# Patient Record
Sex: MALE | Race: WHITE
[De-identification: names, ages, dates, MRNs, and addresses within clinical notes are randomized per-mention and may not be internally consistent; named-entity substitution may affect disease eponyms.]

---

## 2017-05-10 ENCOUNTER — HOSPITAL ENCOUNTER (EMERGENCY)
Dept: HOSPITAL 83 - ED | Age: 22
Discharge: HOME | End: 2017-05-10
Payer: COMMERCIAL

## 2017-05-10 VITALS — WEIGHT: 211 LBS | HEIGHT: 60 IN

## 2017-05-10 VITALS — DIASTOLIC BLOOD PRESSURE: 64 MMHG | SYSTOLIC BLOOD PRESSURE: 127 MMHG

## 2017-05-10 DIAGNOSIS — Z79.899: ICD-10-CM

## 2017-05-10 DIAGNOSIS — R11.0: ICD-10-CM

## 2017-05-10 DIAGNOSIS — M79.1: Primary | ICD-10-CM

## 2017-05-10 LAB
ALBUMIN SERPL-MCNC: 3.9 GM/DL (ref 3.1–4.5)
ALBUMIN SERPL-MCNC: NEGATIVE G/DL
ALP SERPL-CCNC: 104 U/L (ref 45–117)
ALT SERPL W P-5'-P-CCNC: 46 U/L (ref 12–78)
AMPHETAMINES UR QL SCN: < 1000
APPEARANCE UR: (no result)
AST SERPL-CCNC: 20 IU/L (ref 3–35)
BACTERIA #/AREA URNS HPF: (no result) /[HPF]
BARBITURATES UR QL SCN: < 200
BASOPHILS # BLD AUTO: 0 10*3/UL (ref 0–0.1)
BASOPHILS NFR BLD AUTO: 0.3 % (ref 0–1)
BILIRUB UR QL STRIP: (no result)
BUN SERPL-MCNC: 11 MG/DL (ref 7–24)
BZE UR QL SCN: < 300
CHLORIDE SERPL-SCNC: 107 MMOL/L (ref 98–107)
CO2 SERPL-SCNC: 25 MMOL/L (ref 21–32)
COLOR UR: YELLOW
EOSINOPHIL # BLD AUTO: 0.1 10*3/UL (ref 0–0.4)
EOSINOPHIL # BLD AUTO: 1.3 % (ref 1–4)
EPI CELLS #/AREA URNS HPF: (no result) /[HPF]
ERYTHROCYTE [DISTWIDTH] IN BLOOD BY AUTOMATED COUNT: 13.2 % (ref 0–14.5)
GLUCOSE SERPL-MCNC: 103 MG/DL (ref 65–99)
GLUCOSE UR QL: NEGATIVE
HCT VFR BLD AUTO: 42.6 % (ref 42–52)
HGB BLD-MCNC: 14.4 G/DL (ref 14–18)
HGB UR QL STRIP: NEGATIVE
IG #: 0 10*3/UL (ref 0–0.1)
KETONES UR QL STRIP: (no result)
LEUKOCYTE ESTERASE UR QL STRIP: NEGATIVE
LYMPHOCYTES # BLD AUTO: 2.4 10*3/UL (ref 1.3–4.4)
LYMPHOCYTES NFR BLD AUTO: 28.3 % (ref 27–41)
MCH RBC QN AUTO: 28.4 PG (ref 27–31)
MCHC RBC AUTO-ENTMCNC: 33.8 G/DL (ref 33–37)
MCV RBC AUTO: 84 FL (ref 80–94)
MONOCYTES # BLD AUTO: 0.7 10*3/UL (ref 0.1–1)
MONOCYTES NFR BLD MANUAL: 8 % (ref 3–9)
MUCOUS THREADS URNS QL MICRO: (no result)
NEUT #: 5.3 10*3/UL (ref 2.3–7.9)
NEUT %: 61.8 % (ref 47–73)
NITRITE UR QL STRIP: NEGATIVE
NRBC BLD QL AUTO: 0 10*3/UL (ref 0–0)
PH UR STRIP: 5.5 [PH] (ref 5–9)
PLATELET # BLD AUTO: 232 10*3/UL (ref 130–400)
PMV BLD AUTO: 10.8 FL (ref 9.6–12.3)
POTASSIUM SERPL-SCNC: 3.7 MMOL/L (ref 3.5–5.1)
PROT SERPL-MCNC: 7.4 GM/DL (ref 6.4–8.2)
RBC # BLD AUTO: 5.07 10*6/UL (ref 4.5–5.9)
RBC #/AREA URNS HPF: (no result) RBC/HPF (ref 0–2)
SODIUM SERPL-SCNC: 140 MMOL/L (ref 136–145)
SP GR UR: 1.02 (ref 1–1.03)
URINE REFLEX COMMENT: YES
UROBILINOGEN UR STRIP-MCNC: 0.2 E.U./DL (ref 0.2–1)
WBC #/AREA URNS HPF: (no result) WBC/HPF (ref 0–5)
WBC NRBC COR # BLD AUTO: 8.6 10*3/UL (ref 4.8–10.8)

## 2019-11-14 ENCOUNTER — HOSPITAL ENCOUNTER (INPATIENT)
Dept: HOSPITAL 83 - ED | Age: 24
LOS: 8 days | Discharge: HOME | DRG: 166 | End: 2019-11-22
Attending: INTERNAL MEDICINE | Admitting: INTERNAL MEDICINE
Payer: COMMERCIAL

## 2019-11-14 VITALS — DIASTOLIC BLOOD PRESSURE: 65 MMHG | SYSTOLIC BLOOD PRESSURE: 113 MMHG

## 2019-11-14 VITALS — WEIGHT: 210.19 LBS | BODY MASS INDEX: 28.47 KG/M2 | HEIGHT: 71.97 IN

## 2019-11-14 VITALS — DIASTOLIC BLOOD PRESSURE: 66 MMHG

## 2019-11-14 VITALS — DIASTOLIC BLOOD PRESSURE: 70 MMHG | SYSTOLIC BLOOD PRESSURE: 124 MMHG

## 2019-11-14 DIAGNOSIS — D72.829: ICD-10-CM

## 2019-11-14 DIAGNOSIS — E83.41: ICD-10-CM

## 2019-11-14 DIAGNOSIS — Z79.899: ICD-10-CM

## 2019-11-14 DIAGNOSIS — E66.3: ICD-10-CM

## 2019-11-14 DIAGNOSIS — Z79.82: ICD-10-CM

## 2019-11-14 DIAGNOSIS — D64.9: ICD-10-CM

## 2019-11-14 DIAGNOSIS — A15.0: ICD-10-CM

## 2019-11-14 DIAGNOSIS — R73.9: ICD-10-CM

## 2019-11-14 DIAGNOSIS — G80.9: ICD-10-CM

## 2019-11-14 DIAGNOSIS — F12.90: ICD-10-CM

## 2019-11-14 DIAGNOSIS — I95.9: ICD-10-CM

## 2019-11-14 DIAGNOSIS — G62.9: ICD-10-CM

## 2019-11-14 DIAGNOSIS — M51.36: ICD-10-CM

## 2019-11-14 DIAGNOSIS — F41.9: ICD-10-CM

## 2019-11-14 DIAGNOSIS — J85.1: ICD-10-CM

## 2019-11-14 DIAGNOSIS — E66.9: ICD-10-CM

## 2019-11-14 DIAGNOSIS — J95.811: ICD-10-CM

## 2019-11-14 DIAGNOSIS — R91.8: ICD-10-CM

## 2019-11-14 DIAGNOSIS — R74.8: ICD-10-CM

## 2019-11-14 DIAGNOSIS — F32.9: ICD-10-CM

## 2019-11-14 DIAGNOSIS — G89.29: ICD-10-CM

## 2019-11-14 DIAGNOSIS — R07.1: ICD-10-CM

## 2019-11-14 DIAGNOSIS — E44.1: ICD-10-CM

## 2019-11-14 DIAGNOSIS — J98.4: Primary | ICD-10-CM

## 2019-11-14 DIAGNOSIS — E87.6: ICD-10-CM

## 2019-11-14 LAB
ALBUMIN SERPL-MCNC: 3.6 GM/DL (ref 3.1–4.5)
ALP SERPL-CCNC: 136 U/L (ref 45–117)
ALT SERPL W P-5'-P-CCNC: 46 U/L (ref 12–78)
APPEARANCE UR: (no result)
APTT PPP: 30.9 SECONDS (ref 20–32.1)
AST SERPL-CCNC: 16 IU/L (ref 3–35)
BASOPHILS # BLD AUTO: 0.1 10*3/UL (ref 0–0.1)
BASOPHILS NFR BLD AUTO: 0.3 % (ref 0–1)
BILIRUB UR QL STRIP: NEGATIVE
BUN SERPL-MCNC: 12 MG/DL (ref 7–24)
CHLORIDE SERPL-SCNC: 103 MMOL/L (ref 98–107)
COLOR UR: YELLOW
CREAT SERPL-MCNC: 0.95 MG/DL (ref 0.7–1.3)
EOSINOPHIL # BLD AUTO: 0.1 10*3/UL (ref 0–0.4)
EOSINOPHIL # BLD AUTO: 0.8 % (ref 1–4)
ERYTHROCYTE [DISTWIDTH] IN BLOOD BY AUTOMATED COUNT: 12.9 % (ref 0–14.5)
GLUCOSE UR QL: NEGATIVE
HCT VFR BLD AUTO: 41.4 % (ref 42–52)
HGB BLD-MCNC: 13.6 G/DL (ref 14–18)
HGB UR QL STRIP: NEGATIVE
INR BLD: 1 (ref 2–3.5)
KETONES UR QL STRIP: NEGATIVE
LEUKOCYTE ESTERASE UR QL STRIP: NEGATIVE
LIPASE SERPL-CCNC: 57 U/L (ref 73–393)
LYMPHOCYTES # BLD AUTO: 2.6 10*3/UL (ref 1.3–4.4)
LYMPHOCYTES NFR BLD AUTO: 15.2 % (ref 27–41)
MCH RBC QN AUTO: 28.4 PG (ref 27–31)
MCHC RBC AUTO-ENTMCNC: 32.9 G/DL (ref 33–37)
MCV RBC AUTO: 86.4 FL (ref 80–94)
MONOCYTES # BLD AUTO: 1.2 10*3/UL (ref 0.1–1)
MONOCYTES NFR BLD MANUAL: 6.9 % (ref 3–9)
NEUT #: 12.8 10*3/UL (ref 2.3–7.9)
NEUT %: 75.9 % (ref 47–73)
NITRITE UR QL STRIP: NEGATIVE
NRBC BLD QL AUTO: 0 10*3/UL (ref 0–0)
PH UR STRIP: 8.5 [PH] (ref 5–9)
PLATELET # BLD AUTO: 355 10*3/UL (ref 130–400)
PMV BLD AUTO: 10.6 FL (ref 9.6–12.3)
POTASSIUM SERPL-SCNC: 3.7 MMOL/L (ref 3.5–5.1)
PROT SERPL-MCNC: 8.2 GM/DL (ref 6.4–8.2)
RBC # BLD AUTO: 4.79 10*6/UL (ref 4.5–5.9)
SODIUM SERPL-SCNC: 137 MMOL/L (ref 136–145)
SP GR UR: 1.01 (ref 1–1.03)
TROPONIN I SERPL-MCNC: < 0.015 NG/ML (ref ?–0.04)
UROBILINOGEN UR STRIP-MCNC: 0.2 E.U./DL (ref 0.2–1)
WBC NRBC COR # BLD AUTO: 16.9 10*3/UL (ref 4.8–10.8)

## 2019-11-14 NOTE — NUR
THIS RN ADVISED BY MIKE HE, DUE TO MASS FOUND DURING CT TESTING, PT MAY
BE OF CONCERN FOR POSSIBLE TB.MIKE LEMOS HAS HAD PT WEARING MASK AS PRECAUTION.

## 2019-11-14 NOTE — NUR
A 24, admitted to  in negative air flow precautions, under the
services of PATO Sevilla DO with a diagnosis of lung mass.
Chief complaint is lower back pain along with shortness of breath.
Patient arrived via stretcher from ER.
Monitor applied. Initial assessment completed.
Vital signs taken and recorded.
PATO SEVILLA DO notified of admission to the unit.
Orders received.
See assessment for past medical history, medications
and allergies.
Patient and/or family oriented to unit. [facility].
Clothing/patient valuable form completed.
 
FABIENNE FELDER

## 2019-11-14 NOTE — NUR
DR NIEVES AT BEDSIDE TO SPEAK WITH PT, ADVISED BY DOCTOR PT SHOULD BE PLACED IN
NEGATIVE PRESSURE ROOM FOR ADMISSION UNTIL PT SEEN BY PULMONOLOGIST.SIGN PLACED
ON PT DOOR.PT AWAITING ADMISSION.

## 2019-11-14 NOTE — NUR
PT REPORTS TINGLING IN "MY WHOLE RIGHT ARM" AFTER DELTOID INJECTION OF
TORADOL.  INJECTION WAS PROVIDED IN CORRECT ANATOMICAL LOCATION FAR FROM ANY
NERVES.  WILL MONITOR THIS COMPLAINT.

## 2019-11-15 VITALS — DIASTOLIC BLOOD PRESSURE: 68 MMHG | SYSTOLIC BLOOD PRESSURE: 126 MMHG

## 2019-11-15 VITALS — DIASTOLIC BLOOD PRESSURE: 78 MMHG

## 2019-11-15 VITALS — DIASTOLIC BLOOD PRESSURE: 74 MMHG

## 2019-11-15 VITALS — DIASTOLIC BLOOD PRESSURE: 70 MMHG

## 2019-11-15 LAB
BASOPHILS # BLD AUTO: 0.1 10*3/UL (ref 0–0.1)
BASOPHILS # BLD AUTO: 1 % (ref 0–1)
BASOPHILS NFR BLD AUTO: 0.4 % (ref 0–1)
BUN SERPL-MCNC: 13 MG/DL (ref 7–24)
CHLORIDE SERPL-SCNC: 107 MMOL/L (ref 98–107)
CREAT SERPL-MCNC: 1.08 MG/DL (ref 0.7–1.3)
EOSINOPHIL # BLD AUTO: 0.2 10*3/UL (ref 0–0.4)
EOSINOPHIL # BLD AUTO: 1.4 % (ref 1–4)
ERYTHROCYTE [DISTWIDTH] IN BLOOD BY AUTOMATED COUNT: 12.8 % (ref 0–14.5)
ERYTHROCYTE [DISTWIDTH] IN BLOOD BY AUTOMATED COUNT: 12.9 % (ref 0–14.5)
HCT VFR BLD AUTO: 38.6 % (ref 42–52)
HCT VFR BLD AUTO: 38.7 % (ref 42–52)
HGB BLD-MCNC: 12.5 G/DL (ref 14–18)
HGB BLD-MCNC: 12.7 G/DL (ref 14–18)
LYMPHOCYTES # BLD AUTO: 2.5 10*3/UL (ref 1.3–4.4)
LYMPHOCYTES NFR BLD AUTO: 18.4 % (ref 27–41)
MCH RBC QN AUTO: 27.6 PG (ref 27–31)
MCH RBC QN AUTO: 28.2 PG (ref 27–31)
MCHC RBC AUTO-ENTMCNC: 32.3 G/DL (ref 33–37)
MCHC RBC AUTO-ENTMCNC: 32.9 G/DL (ref 33–37)
MCV RBC AUTO: 85.4 FL (ref 80–94)
MCV RBC AUTO: 85.8 FL (ref 80–94)
MONOCYTES # BLD AUTO: 0.9 10*3/UL (ref 0.1–1)
MONOCYTES NFR BLD MANUAL: 6.5 % (ref 3–9)
NEUT #: 9.7 10*3/UL (ref 2.3–7.9)
NEUT %: 72.5 % (ref 47–73)
NRBC BLD QL AUTO: 0 % (ref 0–0)
NRBC BLD QL AUTO: 0 10*3/UL (ref 0–0)
PHOSPHATE SERPL-MCNC: 4 MG/DL (ref 2.5–4.9)
PLATELET # BLD AUTO: 311 10*3/UL (ref 130–400)
PLATELET # BLD AUTO: 325 10*3/UL (ref 130–400)
PLATELET SUFFICIENCY: NORMAL
PMV BLD AUTO: 10.5 FL (ref 9.6–12.3)
PMV BLD AUTO: 10.5 FL (ref 9.6–12.3)
POTASSIUM SERPL-SCNC: 3.4 MMOL/L (ref 3.5–5.1)
RBC # BLD AUTO: 4.5 10*6/UL (ref 4.5–5.9)
RBC # BLD AUTO: 4.53 10*6/UL (ref 4.5–5.9)
RBC MORPH BLD: NORMAL
SODIUM SERPL-SCNC: 140 MMOL/L (ref 136–145)
TOTAL CELLS COUNTED: 100 #CELLS
WBC NRBC COR # BLD AUTO: 13.3 10*3/UL (ref 4.8–10.8)
WBC NRBC COR # BLD AUTO: 14.3 10*3/UL (ref 4.8–10.8)

## 2019-11-16 VITALS — DIASTOLIC BLOOD PRESSURE: 77 MMHG

## 2019-11-16 VITALS — SYSTOLIC BLOOD PRESSURE: 129 MMHG | DIASTOLIC BLOOD PRESSURE: 78 MMHG

## 2019-11-16 VITALS — DIASTOLIC BLOOD PRESSURE: 72 MMHG | SYSTOLIC BLOOD PRESSURE: 125 MMHG

## 2019-11-16 VITALS — DIASTOLIC BLOOD PRESSURE: 64 MMHG | SYSTOLIC BLOOD PRESSURE: 102 MMHG

## 2019-11-16 VITALS — DIASTOLIC BLOOD PRESSURE: 73 MMHG

## 2019-11-16 LAB
BASOPHILS # BLD AUTO: 0.1 10*3/UL (ref 0–0.1)
BASOPHILS NFR BLD AUTO: 0.5 % (ref 0–1)
BUN SERPL-MCNC: 8 MG/DL (ref 7–24)
CHLORIDE SERPL-SCNC: 109 MMOL/L (ref 98–107)
CREAT SERPL-MCNC: 1.15 MG/DL (ref 0.7–1.3)
EOSINOPHIL # BLD AUTO: 0.2 10*3/UL (ref 0–0.4)
EOSINOPHIL # BLD AUTO: 1.5 % (ref 1–4)
ERYTHROCYTE [DISTWIDTH] IN BLOOD BY AUTOMATED COUNT: 13 % (ref 0–14.5)
HCT VFR BLD AUTO: 40.6 % (ref 42–52)
HGB BLD-MCNC: 12.9 G/DL (ref 14–18)
LYMPHOCYTES # BLD AUTO: 2.6 10*3/UL (ref 1.3–4.4)
LYMPHOCYTES NFR BLD AUTO: 20.7 % (ref 27–41)
MCH RBC QN AUTO: 27.7 PG (ref 27–31)
MCHC RBC AUTO-ENTMCNC: 31.8 G/DL (ref 33–37)
MCV RBC AUTO: 87.3 FL (ref 80–94)
MONOCYTES # BLD AUTO: 1 10*3/UL (ref 0.1–1)
MONOCYTES NFR BLD MANUAL: 8.2 % (ref 3–9)
NEUT #: 8.5 10*3/UL (ref 2.3–7.9)
NEUT %: 68.2 % (ref 47–73)
NRBC BLD QL AUTO: 0 % (ref 0–0)
PLATELET # BLD AUTO: 336 10*3/UL (ref 130–400)
PMV BLD AUTO: 10.6 FL (ref 9.6–12.3)
POTASSIUM SERPL-SCNC: 4.1 MMOL/L (ref 3.5–5.1)
RBC # BLD AUTO: 4.65 10*6/UL (ref 4.5–5.9)
SODIUM SERPL-SCNC: 141 MMOL/L (ref 136–145)
WBC NRBC COR # BLD AUTO: 12.4 10*3/UL (ref 4.8–10.8)

## 2019-11-17 VITALS — DIASTOLIC BLOOD PRESSURE: 70 MMHG | SYSTOLIC BLOOD PRESSURE: 118 MMHG

## 2019-11-17 VITALS — DIASTOLIC BLOOD PRESSURE: 65 MMHG

## 2019-11-17 VITALS — DIASTOLIC BLOOD PRESSURE: 55 MMHG

## 2019-11-17 VITALS — DIASTOLIC BLOOD PRESSURE: 67 MMHG

## 2019-11-17 LAB
BASOPHILS # BLD AUTO: 0.1 10*3/UL (ref 0–0.1)
BASOPHILS NFR BLD AUTO: 0.7 % (ref 0–1)
BUN SERPL-MCNC: 10 MG/DL (ref 7–24)
CHLORIDE SERPL-SCNC: 106 MMOL/L (ref 98–107)
CREAT SERPL-MCNC: 1.1 MG/DL (ref 0.7–1.3)
EOSINOPHIL # BLD AUTO: 0.2 10*3/UL (ref 0–0.4)
EOSINOPHIL # BLD AUTO: 2 % (ref 1–4)
ERYTHROCYTE [DISTWIDTH] IN BLOOD BY AUTOMATED COUNT: 12.9 % (ref 0–14.5)
HCT VFR BLD AUTO: 40 % (ref 42–52)
HGB BLD-MCNC: 12.9 G/DL (ref 14–18)
LYMPHOCYTES # BLD AUTO: 3.2 10*3/UL (ref 1.3–4.4)
LYMPHOCYTES NFR BLD AUTO: 27 % (ref 27–41)
MCH RBC QN AUTO: 27.6 PG (ref 27–31)
MCHC RBC AUTO-ENTMCNC: 32.3 G/DL (ref 33–37)
MCV RBC AUTO: 85.7 FL (ref 80–94)
MONOCYTES # BLD AUTO: 0.9 10*3/UL (ref 0.1–1)
MONOCYTES NFR BLD MANUAL: 7.6 % (ref 3–9)
NEUT #: 7.3 10*3/UL (ref 2.3–7.9)
NEUT %: 61.6 % (ref 47–73)
NRBC BLD QL AUTO: 0 10*3/UL (ref 0–0)
PLATELET # BLD AUTO: 358 10*3/UL (ref 130–400)
PMV BLD AUTO: 10.6 FL (ref 9.6–12.3)
POTASSIUM SERPL-SCNC: 3.9 MMOL/L (ref 3.5–5.1)
RBC # BLD AUTO: 4.67 10*6/UL (ref 4.5–5.9)
SODIUM SERPL-SCNC: 141 MMOL/L (ref 136–145)
WBC NRBC COR # BLD AUTO: 11.9 10*3/UL (ref 4.8–10.8)

## 2019-11-17 NOTE — NUR
SITTING IN CHAIR. STATES RELIEF FROM EARLIER MEDS. CALL LIGHT WITHIN REACH.
NO FURTHER VOICED COMPLAINTS

## 2019-11-17 NOTE — NUR
REQUESTED AND RECEIVED NORCO PER PRN ORDER FOR COMPLAINTS OF BACK PAIN RATING
A 6. CALL LIGHT WITHIN REACH. WILL MONITOR FOR EFFECTIVENESS

## 2019-11-17 NOTE — NUR
SLEEPING, AWAKENS EASILY. RESPIRATIONS EASY. LUNGS CLEAR. PULSE OX 98% RA.
NON-PROD COUGH - AWARE OF NEED FOR SPUTUM SPECIMEN AND CUP REMAINS AT
BEDSIDE. BRONCH 11/18 DISCUSSED. CALL LIGHT WITHIN REACH. NO VOICED COMPLAINTS

## 2019-11-18 VITALS — DIASTOLIC BLOOD PRESSURE: 64 MMHG

## 2019-11-18 VITALS — SYSTOLIC BLOOD PRESSURE: 140 MMHG | DIASTOLIC BLOOD PRESSURE: 81 MMHG

## 2019-11-18 VITALS — SYSTOLIC BLOOD PRESSURE: 130 MMHG | DIASTOLIC BLOOD PRESSURE: 70 MMHG

## 2019-11-18 VITALS — DIASTOLIC BLOOD PRESSURE: 71 MMHG

## 2019-11-18 VITALS — SYSTOLIC BLOOD PRESSURE: 101 MMHG | DIASTOLIC BLOOD PRESSURE: 62 MMHG

## 2019-11-18 VITALS — SYSTOLIC BLOOD PRESSURE: 115 MMHG | DIASTOLIC BLOOD PRESSURE: 66 MMHG

## 2019-11-18 VITALS — DIASTOLIC BLOOD PRESSURE: 70 MMHG | SYSTOLIC BLOOD PRESSURE: 114 MMHG

## 2019-11-18 LAB
BASOPHILS # BLD AUTO: 0.1 10*3/UL (ref 0–0.1)
BASOPHILS NFR BLD AUTO: 0.5 % (ref 0–1)
BUN SERPL-MCNC: 10 MG/DL (ref 7–24)
CHLORIDE SERPL-SCNC: 106 MMOL/L (ref 98–107)
CREAT SERPL-MCNC: 1.16 MG/DL (ref 0.7–1.3)
DEPRECATED POLYS/LEUK NFR FLD: 97 %
EOSINOPHIL # BLD AUTO: 0.3 10*3/UL (ref 0–0.4)
EOSINOPHIL # BLD AUTO: 2.4 % (ref 1–4)
ERYTHROCYTE [DISTWIDTH] IN BLOOD BY AUTOMATED COUNT: 13 % (ref 0–14.5)
HCT VFR BLD AUTO: 41.2 % (ref 42–52)
HGB BLD-MCNC: 13.2 G/DL (ref 14–18)
LYMPHOCYTES # BLD AUTO: 3.6 10*3/UL (ref 1.3–4.4)
LYMPHOCYTES NFR BLD AUTO: 31.6 % (ref 27–41)
MACROPHAGES NFR FLD MANUAL: 2 %
MCH RBC QN AUTO: 27.8 PG (ref 27–31)
MCHC RBC AUTO-ENTMCNC: 32 G/DL (ref 33–37)
MCV RBC AUTO: 86.7 FL (ref 80–94)
MONOCYTES # BLD AUTO: 0.8 10*3/UL (ref 0.1–1)
MONOCYTES NFR BLD MANUAL: 7.2 % (ref 3–9)
NEUT #: 6.5 10*3/UL (ref 2.3–7.9)
NEUT %: 57.2 % (ref 47–73)
NEUTROPHILS NFR FLD MANUAL: 1 %
NRBC BLD QL AUTO: 0 % (ref 0–0)
PLATELET # BLD AUTO: 355 10*3/UL (ref 130–400)
PMV BLD AUTO: 10.6 FL (ref 9.6–12.3)
POTASSIUM SERPL-SCNC: 4.1 MMOL/L (ref 3.5–5.1)
RBC # BLD AUTO: 4.75 10*6/UL (ref 4.5–5.9)
SODIUM SERPL-SCNC: 140 MMOL/L (ref 136–145)
WBC NRBC COR # BLD AUTO: 11.3 10*3/UL (ref 4.8–10.8)

## 2019-11-18 PROCEDURE — 0B9C8ZX DRAINAGE OF RIGHT UPPER LUNG LOBE, VIA NATURAL OR ARTIFICIAL OPENING ENDOSCOPIC, DIAGNOSTIC: ICD-10-PCS | Performed by: INTERNAL MEDICINE

## 2019-11-18 NOTE — NUR
Patient resting quietly with no c/o discomfort. Respirations easy and regular.
Vital signs stable. No overt distress.
MINI ACOSTA

## 2019-11-18 NOTE — NUR
in to talk to patient.
Patient states lives at home with girlfriend.
There are few steps in the home.
Physician: resident clinic
Pharmacy:
Home health services: none4
Patient's level of ADLs: MINIMAL ASSIST
Patient has working utilities: all working
DME: cane
Follow-up physician's appointment after d/c: will be made by hospitalist nurse
director upon discharge
Does patient want to access PORTAL?: no
Discharge plan discussed with patient, he states he lives at home with
girlfriend and her family, he states he uses a cane for ambulation. he states
he has been having some difficulty ambulating currently due to hip issues,
discussed with him a short term skilled nursing for rehab and he declined,
also discussed VNA with rehab and he also declined, stated his girlfriend  is a
certified nurses aid and cares for him at home, also educated him that he may
need physical therapy also and he declines any VNA, case management will follow.
 
TERRANCE OLMOS

## 2019-11-19 VITALS — DIASTOLIC BLOOD PRESSURE: 62 MMHG

## 2019-11-19 VITALS — DIASTOLIC BLOOD PRESSURE: 50 MMHG

## 2019-11-19 VITALS — SYSTOLIC BLOOD PRESSURE: 130 MMHG | DIASTOLIC BLOOD PRESSURE: 88 MMHG

## 2019-11-19 VITALS — DIASTOLIC BLOOD PRESSURE: 65 MMHG

## 2019-11-19 VITALS — DIASTOLIC BLOOD PRESSURE: 53 MMHG

## 2019-11-19 LAB
BASOPHILS # BLD AUTO: 0.1 10*3/UL (ref 0–0.1)
BASOPHILS NFR BLD AUTO: 0.5 % (ref 0–1)
BUN SERPL-MCNC: 10 MG/DL (ref 7–24)
CHLORIDE SERPL-SCNC: 108 MMOL/L (ref 98–107)
CREAT SERPL-MCNC: 1.08 MG/DL (ref 0.7–1.3)
EOSINOPHIL # BLD AUTO: 0.3 10*3/UL (ref 0–0.4)
EOSINOPHIL # BLD AUTO: 2.2 % (ref 1–4)
ERYTHROCYTE [DISTWIDTH] IN BLOOD BY AUTOMATED COUNT: 13.2 % (ref 0–14.5)
HCT VFR BLD AUTO: 40.4 % (ref 42–52)
HGB BLD-MCNC: 13 G/DL (ref 14–18)
LYMPHOCYTES # BLD AUTO: 3.2 10*3/UL (ref 1.3–4.4)
LYMPHOCYTES NFR BLD AUTO: 27.8 % (ref 27–41)
MCH RBC QN AUTO: 27.8 PG (ref 27–31)
MCHC RBC AUTO-ENTMCNC: 32.2 G/DL (ref 33–37)
MCV RBC AUTO: 86.3 FL (ref 80–94)
MONOCYTES # BLD AUTO: 0.7 10*3/UL (ref 0.1–1)
MONOCYTES NFR BLD MANUAL: 6.1 % (ref 3–9)
NEUT #: 7.2 10*3/UL (ref 2.3–7.9)
NEUT %: 62.5 % (ref 47–73)
NRBC BLD QL AUTO: 0 % (ref 0–0)
PLATELET # BLD AUTO: 351 10*3/UL (ref 130–400)
PMV BLD AUTO: 10.4 FL (ref 9.6–12.3)
POTASSIUM SERPL-SCNC: 3.9 MMOL/L (ref 3.5–5.1)
RBC # BLD AUTO: 4.68 10*6/UL (ref 4.5–5.9)
SODIUM SERPL-SCNC: 140 MMOL/L (ref 136–145)
SPECIMEN PREPARATION: (no result)
TB1 AG VALUE: 0.03 IU/ML
WBC NRBC COR # BLD AUTO: 11.6 10*3/UL (ref 4.8–10.8)

## 2019-11-19 PROCEDURE — 0BBC8ZX EXCISION OF RIGHT UPPER LUNG LOBE, VIA NATURAL OR ARTIFICIAL OPENING ENDOSCOPIC, DIAGNOSTIC: ICD-10-PCS | Performed by: RADIOLOGY

## 2019-11-19 NOTE — NUR
Dr Pittman notified of the inability to perform cytology sample from ct guided
biopsy d/t lack of fluid in sample. Fungal was also not able to be obtained.

## 2019-11-19 NOTE — NUR
Patient resting quietly with no c/o discomfort. Respirations easy and regular.
Vital signs stable. No overt distress.
AUSTYN MA

## 2019-11-19 NOTE — NUR
pt back from lung biopsy right upper chest puncture site scant blood other
wise intact. pt complians of no pain

## 2019-11-19 NOTE — NUR
case management visits with patient, he states he will be returning home when
medically stable and denies any home needs

## 2019-11-20 VITALS — DIASTOLIC BLOOD PRESSURE: 70 MMHG | SYSTOLIC BLOOD PRESSURE: 119 MMHG

## 2019-11-20 VITALS — DIASTOLIC BLOOD PRESSURE: 60 MMHG

## 2019-11-20 VITALS — DIASTOLIC BLOOD PRESSURE: 74 MMHG | SYSTOLIC BLOOD PRESSURE: 121 MMHG

## 2019-11-20 VITALS — SYSTOLIC BLOOD PRESSURE: 122 MMHG | DIASTOLIC BLOOD PRESSURE: 76 MMHG

## 2019-11-20 VITALS — DIASTOLIC BLOOD PRESSURE: 86 MMHG

## 2019-11-20 LAB
BASOPHILS # BLD AUTO: 0.1 10*3/UL (ref 0–0.1)
BASOPHILS NFR BLD AUTO: 0.5 % (ref 0–1)
BUN SERPL-MCNC: 15 MG/DL (ref 7–24)
CHLORIDE SERPL-SCNC: 105 MMOL/L (ref 98–107)
CREAT SERPL-MCNC: 0.98 MG/DL (ref 0.7–1.3)
EOSINOPHIL # BLD AUTO: 0.3 10*3/UL (ref 0–0.4)
EOSINOPHIL # BLD AUTO: 2.1 % (ref 1–4)
ERYTHROCYTE [DISTWIDTH] IN BLOOD BY AUTOMATED COUNT: 13.1 % (ref 0–14.5)
HCT VFR BLD AUTO: 41 % (ref 42–52)
HGB BLD-MCNC: 13.1 G/DL (ref 14–18)
LYMPHOCYTES # BLD AUTO: 2.8 10*3/UL (ref 1.3–4.4)
LYMPHOCYTES NFR BLD AUTO: 22.2 % (ref 27–41)
MCH RBC QN AUTO: 27.3 PG (ref 27–31)
MCHC RBC AUTO-ENTMCNC: 32 G/DL (ref 33–37)
MCV RBC AUTO: 85.6 FL (ref 80–94)
MONOCYTES # BLD AUTO: 0.8 10*3/UL (ref 0.1–1)
MONOCYTES NFR BLD MANUAL: 6.7 % (ref 3–9)
NEUT #: 8.5 10*3/UL (ref 2.3–7.9)
NEUT %: 67.6 % (ref 47–73)
NRBC BLD QL AUTO: 0 10*3/UL (ref 0–0)
PLATELET # BLD AUTO: 354 10*3/UL (ref 130–400)
PMV BLD AUTO: 10.6 FL (ref 9.6–12.3)
POTASSIUM SERPL-SCNC: 3.9 MMOL/L (ref 3.5–5.1)
RBC # BLD AUTO: 4.79 10*6/UL (ref 4.5–5.9)
SODIUM SERPL-SCNC: 136 MMOL/L (ref 136–145)
SPECIMEN PREPARATION: (no result)
WBC NRBC COR # BLD AUTO: 12.5 10*3/UL (ref 4.8–10.8)

## 2019-11-20 NOTE — NUR
case management visits with patient, he will return home when medically stable
and denies any home needs

## 2019-11-20 NOTE — NUR
SPOKE TO DR MAHAJAN REGARDING PLAN OF CARE. PT C/O THAT "NO ONE IS TELLING HIM
WHARS GOING ON". PER DR MAHAJAN, AWAITING CXR RESULTS FROM THIS AM.WILL ROUND
AND SPEAK TO PT AND SIGNIFICANT OTHER AT BEDSIDE REGARDING PLAN OF CARE.
EDUCATION PROVIDED TO PT REGARDING THIS AND ROXYANIA SMOKING CESSATION.

## 2019-11-20 NOTE — NUR
PT C/O "DR'S NOT COMMUNICATING WITH EACH OTHER " DR MAHAJAN HAD ROUNDED AND
SPOKE WITH PT. PER PT HE STATES "THE DR TOLD ME I WOULD GET TO GO HOME TODAY".
PT ASKED TO SPEAK WITH SUPERVISOR AT THIS TIME. CAILIN NOTIFIED.

## 2019-11-20 NOTE — NUR
Patient resting quietly with no c/o discomfort. Respirations easy and regular.
Vital signs stable. No overt distress.Stable at this time.Significant other
at bedside.Call light in reach.
ERICA MEJIA

## 2019-11-21 VITALS — DIASTOLIC BLOOD PRESSURE: 69 MMHG

## 2019-11-21 VITALS — SYSTOLIC BLOOD PRESSURE: 64 MMHG | DIASTOLIC BLOOD PRESSURE: 48 MMHG

## 2019-11-21 VITALS — DIASTOLIC BLOOD PRESSURE: 68 MMHG

## 2019-11-21 VITALS — DIASTOLIC BLOOD PRESSURE: 65 MMHG | SYSTOLIC BLOOD PRESSURE: 122 MMHG

## 2019-11-21 VITALS — SYSTOLIC BLOOD PRESSURE: 101 MMHG | DIASTOLIC BLOOD PRESSURE: 62 MMHG

## 2019-11-21 VITALS — DIASTOLIC BLOOD PRESSURE: 60 MMHG

## 2019-11-21 VITALS — DIASTOLIC BLOOD PRESSURE: 59 MMHG

## 2019-11-21 LAB
BASOPHILS # BLD AUTO: 0.1 10*3/UL (ref 0–0.1)
BASOPHILS NFR BLD AUTO: 0.4 % (ref 0–1)
BUN SERPL-MCNC: 18 MG/DL (ref 7–24)
CHLORIDE SERPL-SCNC: 105 MMOL/L (ref 98–107)
CREAT SERPL-MCNC: 0.9 MG/DL (ref 0.7–1.3)
EOSINOPHIL # BLD AUTO: 0.2 10*3/UL (ref 0–0.4)
EOSINOPHIL # BLD AUTO: 1.8 % (ref 1–4)
ERYTHROCYTE [DISTWIDTH] IN BLOOD BY AUTOMATED COUNT: 13.1 % (ref 0–14.5)
HCT VFR BLD AUTO: 41.6 % (ref 42–52)
HGB BLD-MCNC: 13.6 G/DL (ref 14–18)
LYMPHOCYTES # BLD AUTO: 2.8 10*3/UL (ref 1.3–4.4)
LYMPHOCYTES NFR BLD AUTO: 23.2 % (ref 27–41)
MCH RBC QN AUTO: 27.8 PG (ref 27–31)
MCHC RBC AUTO-ENTMCNC: 32.7 G/DL (ref 33–37)
MCV RBC AUTO: 85.1 FL (ref 80–94)
MONOCYTES # BLD AUTO: 0.8 10*3/UL (ref 0.1–1)
MONOCYTES NFR BLD MANUAL: 6.5 % (ref 3–9)
NEUT #: 8 10*3/UL (ref 2.3–7.9)
NEUT %: 67.1 % (ref 47–73)
NRBC BLD QL AUTO: 0 % (ref 0–0)
PLATELET # BLD AUTO: 345 10*3/UL (ref 130–400)
PMV BLD AUTO: 10.5 FL (ref 9.6–12.3)
POTASSIUM SERPL-SCNC: 3.8 MMOL/L (ref 3.5–5.1)
RBC # BLD AUTO: 4.89 10*6/UL (ref 4.5–5.9)
SODIUM SERPL-SCNC: 137 MMOL/L (ref 136–145)
WBC NRBC COR # BLD AUTO: 11.9 10*3/UL (ref 4.8–10.8)

## 2019-11-21 NOTE — NUR
SPOKE W/DR. MAHAJAN RE CXR RESULTS AND DR. DIXON STATES PT CAN BE D/C TODAY AND
PT REQUESTING SCRIPT FOR [place].

## 2019-11-21 NOTE — NUR
PT STANDING IN DOORWAY.  C/O DIZZINESS, PROFUSELY DIAPHORETIC, PALE, AND
EXPECTORATING BLOODY SPUTUM W/NOSE BLEED.  PT ASSISTED TO RECLINER CHAIR.
VS 64/48, 75, 97.9, 93% RA, BS 83.
DR. PANTOJA ON FLOOR AND NOTIFIED.  STAT PORTABLE CXR ORDERED.

## 2019-11-22 VITALS — DIASTOLIC BLOOD PRESSURE: 74 MMHG

## 2019-11-22 VITALS — DIASTOLIC BLOOD PRESSURE: 60 MMHG | SYSTOLIC BLOOD PRESSURE: 107 MMHG

## 2019-11-22 LAB
BASOPHILS # BLD AUTO: 0.1 10*3/UL (ref 0–0.1)
BASOPHILS NFR BLD AUTO: 0.5 % (ref 0–1)
BUN SERPL-MCNC: 18 MG/DL (ref 7–24)
CHLORIDE SERPL-SCNC: 106 MMOL/L (ref 98–107)
CREAT SERPL-MCNC: 1.01 MG/DL (ref 0.7–1.3)
EOSINOPHIL # BLD AUTO: 0.4 10*3/UL (ref 0–0.4)
EOSINOPHIL # BLD AUTO: 3.1 % (ref 1–4)
ERYTHROCYTE [DISTWIDTH] IN BLOOD BY AUTOMATED COUNT: 13.1 % (ref 0–14.5)
HCT VFR BLD AUTO: 40.8 % (ref 42–52)
HGB BLD-MCNC: 13.3 G/DL (ref 14–18)
LYMPHOCYTES # BLD AUTO: 2.9 10*3/UL (ref 1.3–4.4)
LYMPHOCYTES NFR BLD AUTO: 23 % (ref 27–41)
MCH RBC QN AUTO: 27.8 PG (ref 27–31)
MCHC RBC AUTO-ENTMCNC: 32.6 G/DL (ref 33–37)
MCV RBC AUTO: 85.2 FL (ref 80–94)
MONOCYTES # BLD AUTO: 0.7 10*3/UL (ref 0.1–1)
MONOCYTES NFR BLD MANUAL: 5.5 % (ref 3–9)
NEUT #: 8.4 10*3/UL (ref 2.3–7.9)
NEUT %: 66.3 % (ref 47–73)
NRBC BLD QL AUTO: 0 10*3/UL (ref 0–0)
PLATELET # BLD AUTO: 317 10*3/UL (ref 130–400)
PMV BLD AUTO: 10.3 FL (ref 9.6–12.3)
POTASSIUM SERPL-SCNC: 3.9 MMOL/L (ref 3.5–5.1)
RBC # BLD AUTO: 4.79 10*6/UL (ref 4.5–5.9)
SODIUM SERPL-SCNC: 137 MMOL/L (ref 136–145)
WBC NRBC COR # BLD AUTO: 12.6 10*3/UL (ref 4.8–10.8)

## 2019-11-22 NOTE — NUR
SPOKE WITH DR DIXON REGARDING CHEST XRAY RESULTS. STATED TO LET THE REST OF THE
TEAM KNOW PATIENT WAS ABLE TO BE DISCHARGED TODAY. ALSO TO FOLLOW UP WITH HIM
IN 2 WEEKS.

## 2019-11-22 NOTE — NUR
Discharge instructions reviewed with patient/family. Patient receptive and
verbalizes understanding. Follow-up care arranged. Written instructions given
to patient/family.
CECY ANGEL

## 2021-06-27 ENCOUNTER — HOSPITAL ENCOUNTER (EMERGENCY)
Dept: HOSPITAL 83 - ED | Age: 26
Discharge: HOME | End: 2021-06-27
Payer: COMMERCIAL

## 2021-06-27 VITALS — SYSTOLIC BLOOD PRESSURE: 146 MMHG | DIASTOLIC BLOOD PRESSURE: 75 MMHG

## 2021-06-27 VITALS — HEIGHT: 71.97 IN | WEIGHT: 196 LBS | BODY MASS INDEX: 26.55 KG/M2

## 2021-06-27 DIAGNOSIS — M17.11: ICD-10-CM
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
## 2021-06-27 DIAGNOSIS — Z98.890: ICD-10-CM

## 2021-06-27 DIAGNOSIS — Z79.2: ICD-10-CM
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
## 2021-06-27 DIAGNOSIS — Z79.82: ICD-10-CM

## 2021-06-27 DIAGNOSIS — S86.911A: Primary | ICD-10-CM

## 2021-06-27 DIAGNOSIS — Z79.899: ICD-10-CM

## 2021-11-08 ENCOUNTER — HOSPITAL ENCOUNTER (EMERGENCY)
Dept: HOSPITAL 83 - ED | Age: 26
Discharge: HOME | End: 2021-11-08
Payer: COMMERCIAL

## 2021-11-08 VITALS — DIASTOLIC BLOOD PRESSURE: 88 MMHG

## 2021-11-08 DIAGNOSIS — Z79.82: ICD-10-CM

## 2021-11-08 DIAGNOSIS — Z79.2: ICD-10-CM

## 2021-11-08 DIAGNOSIS — Z79.899: ICD-10-CM

## 2021-11-08 DIAGNOSIS — R25.1: ICD-10-CM

## 2021-11-08 DIAGNOSIS — Z98.890: ICD-10-CM

## 2021-11-08 DIAGNOSIS — R42: ICD-10-CM

## 2021-11-08 DIAGNOSIS — R11.0: Primary | ICD-10-CM

## 2021-11-08 DIAGNOSIS — Z86.69: ICD-10-CM

## 2021-11-08 LAB
ALBUMIN SERPL-MCNC: 3.6 GM/DL (ref 3.1–4.5)
ALP SERPL-CCNC: 96 U/L (ref 45–117)
ALT SERPL W P-5'-P-CCNC: 23 U/L (ref 12–78)
AST SERPL-CCNC: 13 IU/L (ref 3–35)
BASOPHILS # BLD AUTO: 0.1 10*3/UL (ref 0–0.1)
BASOPHILS NFR BLD AUTO: 0.6 % (ref 0–1)
BUN SERPL-MCNC: 15 MG/DL (ref 7–24)
CHLORIDE SERPL-SCNC: 111 MMOL/L (ref 98–107)
CREAT SERPL-MCNC: 0.92 MG/DL (ref 0.7–1.3)
EOSINOPHIL # BLD AUTO: 0.3 10*3/UL (ref 0–0.4)
EOSINOPHIL # BLD AUTO: 2.6 % (ref 1–4)
ERYTHROCYTE [DISTWIDTH] IN BLOOD BY AUTOMATED COUNT: 13.5 % (ref 0–14.5)
HCT VFR BLD AUTO: 41.1 % (ref 42–52)
LYMPHOCYTES # BLD AUTO: 3.5 10*3/UL (ref 1.3–4.4)
LYMPHOCYTES NFR BLD AUTO: 35.1 % (ref 27–41)
MCH RBC QN AUTO: 28.6 PG (ref 27–31)
MCHC RBC AUTO-ENTMCNC: 32.8 G/DL (ref 33–37)
MCV RBC AUTO: 87.1 FL (ref 80–94)
MONOCYTES # BLD AUTO: 0.7 10*3/UL (ref 0.1–1)
MONOCYTES NFR BLD MANUAL: 6.6 % (ref 3–9)
NEUT #: 5.5 10*3/UL (ref 2.3–7.9)
NEUT %: 54.6 % (ref 47–73)
NRBC BLD QL AUTO: 0 % (ref 0–0)
PLATELET # BLD AUTO: 241 10*3/UL (ref 130–400)
PMV BLD AUTO: 10.7 FL (ref 9.6–12.3)
POTASSIUM SERPL-SCNC: 3.8 MMOL/L (ref 3.5–5.1)
PROT SERPL-MCNC: 7 GM/DL (ref 6.4–8.2)
RBC # BLD AUTO: 4.72 10*6/UL (ref 4.5–5.9)
SODIUM SERPL-SCNC: 143 MMOL/L (ref 136–145)
WBC NRBC COR # BLD AUTO: 10.1 10*3/UL (ref 4.8–10.8)

## 2022-01-03 ENCOUNTER — HOSPITAL ENCOUNTER (EMERGENCY)
Dept: HOSPITAL 83 - ED | Age: 27
Discharge: HOME | End: 2022-01-03
Payer: COMMERCIAL

## 2022-01-03 VITALS — WEIGHT: 181 LBS | BODY MASS INDEX: 24.52 KG/M2 | HEIGHT: 71.97 IN

## 2022-01-03 VITALS — DIASTOLIC BLOOD PRESSURE: 70 MMHG

## 2022-01-03 DIAGNOSIS — F41.9: Primary | ICD-10-CM

## 2022-01-16 ENCOUNTER — HOSPITAL ENCOUNTER (EMERGENCY)
Dept: HOSPITAL 83 - ED | Age: 27
Discharge: HOME | End: 2022-01-16
Payer: COMMERCIAL

## 2022-01-16 VITALS — HEIGHT: 71.97 IN | BODY MASS INDEX: 24.52 KG/M2 | WEIGHT: 181 LBS

## 2022-01-16 VITALS — DIASTOLIC BLOOD PRESSURE: 61 MMHG

## 2022-01-16 DIAGNOSIS — F32.9: ICD-10-CM

## 2022-01-16 DIAGNOSIS — F41.9: Primary | ICD-10-CM

## 2022-01-20 ENCOUNTER — HOSPITAL ENCOUNTER (EMERGENCY)
Dept: HOSPITAL 83 - ED | Age: 27
Discharge: HOME | End: 2022-01-20
Payer: COMMERCIAL

## 2022-01-20 VITALS — BODY MASS INDEX: 24.38 KG/M2 | HEIGHT: 71.97 IN | WEIGHT: 180 LBS

## 2022-01-20 VITALS — DIASTOLIC BLOOD PRESSURE: 59 MMHG

## 2022-01-20 DIAGNOSIS — Z98.890: ICD-10-CM

## 2022-01-20 DIAGNOSIS — R55: Primary | ICD-10-CM

## 2022-01-20 DIAGNOSIS — F41.9: ICD-10-CM

## 2022-01-20 LAB
ALBUMIN SERPL-MCNC: 3.6 GM/DL (ref 3.1–4.5)
ALP SERPL-CCNC: 77 U/L (ref 45–117)
ALT SERPL W P-5'-P-CCNC: 23 U/L (ref 12–78)
AST SERPL-CCNC: 13 IU/L (ref 3–35)
BASOPHILS # BLD AUTO: 0.1 10*3/UL (ref 0–0.1)
BASOPHILS NFR BLD AUTO: 0.6 % (ref 0–1)
BUN SERPL-MCNC: 13 MG/DL (ref 7–24)
CHLORIDE SERPL-SCNC: 108 MMOL/L (ref 98–107)
CK SERPL-CCNC: 97 U/L (ref 39–308)
CREAT SERPL-MCNC: 0.85 MG/DL (ref 0.7–1.3)
EOSINOPHIL # BLD AUTO: 0.3 10*3/UL (ref 0–0.4)
EOSINOPHIL # BLD AUTO: 3.1 % (ref 1–4)
ERYTHROCYTE [DISTWIDTH] IN BLOOD BY AUTOMATED COUNT: 13.4 % (ref 0–14.5)
ETHANOL SERPL-MCNC: < 3 MG/DL (ref ?–3)
HCT VFR BLD AUTO: 38.9 % (ref 42–52)
LIPASE SERPL-CCNC: 64 U/L (ref 73–393)
LYMPHOCYTES # BLD AUTO: 2.4 10*3/UL (ref 1.3–4.4)
LYMPHOCYTES NFR BLD AUTO: 29 % (ref 27–41)
MCH RBC QN AUTO: 29.5 PG (ref 27–31)
MCHC RBC AUTO-ENTMCNC: 33.9 G/DL (ref 33–37)
MCV RBC AUTO: 86.8 FL (ref 80–94)
MONOCYTES # BLD AUTO: 0.6 10*3/UL (ref 0.1–1)
MONOCYTES NFR BLD MANUAL: 7.5 % (ref 3–9)
NEUT #: 5 10*3/UL (ref 2.3–7.9)
NEUT %: 59.4 % (ref 47–73)
NRBC BLD QL AUTO: 0 % (ref 0–0)
PLATELET # BLD AUTO: 228 10*3/UL (ref 130–400)
PMV BLD AUTO: 10.5 FL (ref 9.6–12.3)
POTASSIUM SERPL-SCNC: 3.7 MMOL/L (ref 3.5–5.1)
PROT SERPL-MCNC: 6.8 GM/DL (ref 6.4–8.2)
RBC # BLD AUTO: 4.48 10*6/UL (ref 4.5–5.9)
SODIUM SERPL-SCNC: 139 MMOL/L (ref 136–145)
WBC NRBC COR # BLD AUTO: 8.4 10*3/UL (ref 4.8–10.8)

## 2022-03-10 ENCOUNTER — HOSPITAL ENCOUNTER (EMERGENCY)
Dept: HOSPITAL 83 - ED | Age: 27
LOS: 1 days | Discharge: HOME | End: 2022-03-11
Payer: COMMERCIAL

## 2022-03-10 VITALS — HEIGHT: 60 IN

## 2022-03-10 VITALS — DIASTOLIC BLOOD PRESSURE: 82 MMHG

## 2022-03-10 DIAGNOSIS — F41.9: Primary | ICD-10-CM

## 2022-03-11 ENCOUNTER — HOSPITAL ENCOUNTER (EMERGENCY)
Dept: HOSPITAL 83 - ED | Age: 27
Discharge: HOME | End: 2022-03-11
Payer: COMMERCIAL

## 2022-03-11 VITALS — BODY MASS INDEX: 24.38 KG/M2 | HEIGHT: 71.97 IN | WEIGHT: 180 LBS

## 2022-03-11 VITALS — DIASTOLIC BLOOD PRESSURE: 40 MMHG

## 2022-03-11 DIAGNOSIS — R10.9: Primary | ICD-10-CM

## 2022-03-11 DIAGNOSIS — R51.9: ICD-10-CM

## 2022-03-11 DIAGNOSIS — F41.9: ICD-10-CM

## 2022-05-30 ENCOUNTER — HOSPITAL ENCOUNTER (EMERGENCY)
Dept: HOSPITAL 83 - ED | Age: 27
Discharge: HOME | End: 2022-05-30
Payer: COMMERCIAL

## 2022-05-30 VITALS — HEIGHT: 71.97 IN | BODY MASS INDEX: 24.79 KG/M2 | WEIGHT: 183 LBS

## 2022-05-30 VITALS — SYSTOLIC BLOOD PRESSURE: 98 MMHG | DIASTOLIC BLOOD PRESSURE: 53 MMHG

## 2022-05-30 DIAGNOSIS — F41.9: Primary | ICD-10-CM

## 2022-05-30 DIAGNOSIS — Z98.890: ICD-10-CM

## 2022-10-07 ENCOUNTER — HOSPITAL ENCOUNTER (EMERGENCY)
Dept: HOSPITAL 83 - ED | Age: 27
Discharge: LEFT BEFORE DISCHARGE | End: 2022-10-07
Payer: COMMERCIAL

## 2022-10-07 VITALS — DIASTOLIC BLOOD PRESSURE: 74 MMHG

## 2022-10-07 VITALS — HEIGHT: 71.97 IN | WEIGHT: 172 LBS | BODY MASS INDEX: 23.3 KG/M2
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
## 2022-10-07 DIAGNOSIS — Z53.21: ICD-10-CM
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
## 2022-10-07 DIAGNOSIS — M25.512: Primary | ICD-10-CM

## 2022-11-13 ENCOUNTER — HOSPITAL ENCOUNTER (EMERGENCY)
Dept: HOSPITAL 83 - ED | Age: 27
Discharge: HOME | End: 2022-11-13
Payer: COMMERCIAL

## 2022-11-13 VITALS — HEIGHT: 71.97 IN | WEIGHT: 169 LBS | BODY MASS INDEX: 22.89 KG/M2

## 2022-11-13 VITALS — SYSTOLIC BLOOD PRESSURE: 108 MMHG | DIASTOLIC BLOOD PRESSURE: 62 MMHG

## 2022-11-13 DIAGNOSIS — F41.9: Primary | ICD-10-CM

## 2022-11-13 DIAGNOSIS — Z98.890: ICD-10-CM

## 2023-03-29 ENCOUNTER — HOSPITAL ENCOUNTER (EMERGENCY)
Dept: HOSPITAL 83 - ED | Age: 28
Discharge: HOME | End: 2023-03-29
Payer: COMMERCIAL

## 2023-03-29 VITALS — HEIGHT: 60 IN

## 2023-03-29 DIAGNOSIS — F41.9: ICD-10-CM

## 2023-03-29 DIAGNOSIS — F32.A: ICD-10-CM

## 2023-03-29 DIAGNOSIS — F12.90: ICD-10-CM

## 2023-03-29 DIAGNOSIS — G80.9: Primary | ICD-10-CM

## 2023-03-29 DIAGNOSIS — Z98.890: ICD-10-CM

## 2023-03-29 LAB
ALP SERPL-CCNC: 83 U/L (ref 46–116)
ALT SERPL W P-5'-P-CCNC: 11 U/L (ref 10–49)
APTT PPP: 29.9 SECONDS (ref 20–32.1)
BASOPHILS # BLD AUTO: 0 10*3/UL (ref 0–0.1)
BASOPHILS NFR BLD AUTO: 0.5 % (ref 0–1)
BUN SERPL-MCNC: 9 MG/DL (ref 9–23)
CHLORIDE SERPL-SCNC: 106 MMOL/L (ref 98–107)
EOSINOPHIL # BLD AUTO: 0.3 10*3/UL (ref 0–0.4)
EOSINOPHIL # BLD AUTO: 3.2 % (ref 1–4)
ERYTHROCYTE [DISTWIDTH] IN BLOOD BY AUTOMATED COUNT: 13.2 % (ref 0–14.5)
HCT VFR BLD AUTO: 42.7 % (ref 42–52)
INR BLD: 1.2 (ref 2–3.5)
LIPASE SERPL-CCNC: 27 U/L (ref 12–53)
LYMPHOCYTES # BLD AUTO: 2.8 10*3/UL (ref 1.3–4.4)
LYMPHOCYTES NFR BLD AUTO: 36.6 % (ref 27–41)
MCH RBC QN AUTO: 29 PG (ref 27–31)
MCHC RBC AUTO-ENTMCNC: 33.3 G/DL (ref 33–37)
MCV RBC AUTO: 87.1 FL (ref 80–94)
MONOCYTES # BLD AUTO: 0.6 10*3/UL (ref 0.1–1)
MONOCYTES NFR BLD MANUAL: 7.7 % (ref 3–9)
NEUT #: 4 10*3/UL (ref 2.3–7.9)
NEUT %: 51.9 % (ref 47–73)
NRBC BLD QL AUTO: 0 10*3/UL (ref 0–0)
PLATELET # BLD AUTO: 213 10*3/UL (ref 130–400)
PMV BLD AUTO: 10.6 FL (ref 9.6–12.3)
POTASSIUM SERPL-SCNC: 3.7 MMOL/L (ref 3.4–5.1)
PROT SERPL-MCNC: 7.3 GM/DL (ref 6–8)
RBC # BLD AUTO: 4.9 10*6/UL (ref 4.5–5.9)
WBC NRBC COR # BLD AUTO: 7.8 10*3/UL (ref 4.8–10.8)

## 2023-03-30 ENCOUNTER — HOSPITAL ENCOUNTER (EMERGENCY)
Dept: HOSPITAL 83 - ED | Age: 28
LOS: 1 days | Discharge: HOME | End: 2023-03-31
Payer: COMMERCIAL

## 2023-03-30 VITALS — WEIGHT: 180 LBS | BODY MASS INDEX: 24.38 KG/M2 | HEIGHT: 71.97 IN

## 2023-03-30 VITALS — SYSTOLIC BLOOD PRESSURE: 94 MMHG | DIASTOLIC BLOOD PRESSURE: 55 MMHG

## 2023-03-30 DIAGNOSIS — F17.200: ICD-10-CM

## 2023-03-30 DIAGNOSIS — S09.90XA: Primary | ICD-10-CM
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
## 2023-03-30 DIAGNOSIS — F41.0: ICD-10-CM

## 2023-03-30 DIAGNOSIS — M62.838: ICD-10-CM

## 2023-03-30 DIAGNOSIS — Z98.890: ICD-10-CM
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
## 2023-07-12 ENCOUNTER — HOSPITAL ENCOUNTER (EMERGENCY)
Dept: HOSPITAL 83 - ED | Age: 28
Discharge: HOME | End: 2023-07-12
Payer: COMMERCIAL

## 2023-07-12 VITALS — BODY MASS INDEX: 22.35 KG/M2 | WEIGHT: 165 LBS | HEIGHT: 71.97 IN

## 2023-07-12 VITALS — DIASTOLIC BLOOD PRESSURE: 85 MMHG

## 2023-07-12 DIAGNOSIS — Z79.82: ICD-10-CM

## 2023-07-12 DIAGNOSIS — F41.9: ICD-10-CM

## 2023-07-12 DIAGNOSIS — F43.20: Primary | ICD-10-CM

## 2023-07-12 DIAGNOSIS — Z98.890: ICD-10-CM

## 2023-07-12 DIAGNOSIS — Z79.899: ICD-10-CM

## 2023-07-12 LAB
ALP SERPL-CCNC: 102 U/L (ref 46–116)
ALT SERPL W P-5'-P-CCNC: 15 U/L (ref 10–49)
APTT PPP: 27.5 SECONDS (ref 20–32.1)
BASOPHILS # BLD AUTO: 0.1 10*3/UL (ref 0–0.1)
BASOPHILS NFR BLD AUTO: 0.5 % (ref 0–1)
BUN SERPL-MCNC: 14 MG/DL (ref 9–23)
CHLORIDE SERPL-SCNC: 108 MMOL/L (ref 98–107)
CK SERPL-CCNC: 143 U/L (ref 34–171)
EOSINOPHIL # BLD AUTO: 0.4 10*3/UL (ref 0–0.4)
EOSINOPHIL # BLD AUTO: 3.1 % (ref 1–4)
EPI CELLS #/AREA URNS HPF: (no result) /[HPF]
ERYTHROCYTE [DISTWIDTH] IN BLOOD BY AUTOMATED COUNT: 13.3 % (ref 0–14.5)
HCT VFR BLD AUTO: 43.9 % (ref 42–52)
INR BLD: 1 (ref 2–3.5)
LIPASE SERPL-CCNC: 29 U/L (ref 12–53)
LYMPHOCYTES # BLD AUTO: 3.3 10*3/UL (ref 1.3–4.4)
LYMPHOCYTES NFR BLD AUTO: 25.1 % (ref 27–41)
MCH RBC QN AUTO: 29.3 PG (ref 27–31)
MCHC RBC AUTO-ENTMCNC: 33.5 G/DL (ref 33–37)
MCV RBC AUTO: 87.6 FL (ref 80–94)
MONOCYTES # BLD AUTO: 1 10*3/UL (ref 0.1–1)
MONOCYTES NFR BLD MANUAL: 7.5 % (ref 3–9)
NEUT #: 8.4 10*3/UL (ref 2.3–7.9)
NEUT %: 63.3 % (ref 47–73)
NRBC BLD QL AUTO: 0 10*3/UL (ref 0–0)
PH UR STRIP: 5.5 [PH] (ref 4.5–8)
PLATELET # BLD AUTO: 232 10*3/UL (ref 130–400)
PMV BLD AUTO: 10.2 FL (ref 9.6–12.3)
POTASSIUM SERPL-SCNC: 3.9 MMOL/L (ref 3.4–5.1)
PROT SERPL-MCNC: 7.5 GM/DL (ref 6–8)
RBC # BLD AUTO: 5.01 10*6/UL (ref 4.5–5.9)
RBC #/AREA URNS HPF: (no result) RBC/HPF (ref 0–2)
SP GR UR: 1.02 (ref 1–1.03)
UROBILINOGEN UR STRIP-MCNC: 1 E.U./DL (ref 0–1)
WBC NRBC COR # BLD AUTO: 13.2 10*3/UL (ref 4.8–10.8)

## 2023-09-06 ENCOUNTER — HOSPITAL ENCOUNTER (EMERGENCY)
Dept: HOSPITAL 83 - ED | Age: 28
Discharge: HOME | End: 2023-09-06
Payer: COMMERCIAL

## 2023-09-06 VITALS — BODY MASS INDEX: 22.35 KG/M2 | WEIGHT: 165 LBS | HEIGHT: 71.97 IN

## 2023-09-06 DIAGNOSIS — Z98.890: ICD-10-CM

## 2023-09-06 DIAGNOSIS — F41.9: Primary | ICD-10-CM

## 2023-09-06 DIAGNOSIS — F12.90: ICD-10-CM

## 2023-09-06 DIAGNOSIS — R06.02: ICD-10-CM

## 2023-09-06 DIAGNOSIS — F32.A: ICD-10-CM

## 2023-09-06 LAB
ALP SERPL-CCNC: 73 U/L (ref 46–116)
ALT SERPL W P-5'-P-CCNC: 45 U/L (ref 10–49)
APTT PPP: 28.2 SECONDS (ref 20–32.1)
BASOPHILS # BLD AUTO: 0 10*3/UL (ref 0–0.1)
BASOPHILS NFR BLD AUTO: 0.5 % (ref 0–1)
BUN SERPL-MCNC: 16 MG/DL (ref 9–23)
CHLORIDE SERPL-SCNC: 105 MMOL/L (ref 98–107)
EOSINOPHIL # BLD AUTO: 0.3 10*3/UL (ref 0–0.4)
EOSINOPHIL # BLD AUTO: 3.8 % (ref 1–4)
ERYTHROCYTE [DISTWIDTH] IN BLOOD BY AUTOMATED COUNT: 13.2 % (ref 0–14.5)
HCT VFR BLD AUTO: 42.3 % (ref 42–52)
INR BLD: 1 (ref 2–3.5)
LIPASE SERPL-CCNC: 34 U/L (ref 12–53)
LYMPHOCYTES # BLD AUTO: 2.3 10*3/UL (ref 1.3–4.4)
LYMPHOCYTES NFR BLD AUTO: 31.5 % (ref 27–41)
MCH RBC QN AUTO: 29.5 PG (ref 27–31)
MCHC RBC AUTO-ENTMCNC: 34.3 G/DL (ref 33–37)
MCV RBC AUTO: 86.2 FL (ref 80–94)
MONOCYTES # BLD AUTO: 0.5 10*3/UL (ref 0.1–1)
MONOCYTES NFR BLD MANUAL: 7 % (ref 3–9)
NEUT #: 4 10*3/UL (ref 2.3–7.9)
NEUT %: 55 % (ref 47–73)
NRBC BLD QL AUTO: 0 % (ref 0–0)
PLATELET # BLD AUTO: 194 10*3/UL (ref 130–400)
PMV BLD AUTO: 10.9 FL (ref 9.6–12.3)
POTASSIUM SERPL-SCNC: 3.8 MMOL/L (ref 3.4–5.1)
PROT SERPL-MCNC: 7 GM/DL (ref 6–8)
RBC # BLD AUTO: 4.91 10*6/UL (ref 4.5–5.9)
WBC NRBC COR # BLD AUTO: 7.3 10*3/UL (ref 4.8–10.8)

## 2023-09-16 ENCOUNTER — HOSPITAL ENCOUNTER (EMERGENCY)
Dept: HOSPITAL 83 - ED | Age: 28
Discharge: HOME | End: 2023-09-16
Payer: COMMERCIAL

## 2023-09-16 VITALS — DIASTOLIC BLOOD PRESSURE: 57 MMHG

## 2023-09-16 VITALS — HEIGHT: 60 IN | WEIGHT: 188.25 LBS

## 2023-09-16 DIAGNOSIS — F17.210: ICD-10-CM

## 2023-09-16 DIAGNOSIS — Z79.899: ICD-10-CM

## 2023-09-16 DIAGNOSIS — R41.82: ICD-10-CM

## 2023-09-16 DIAGNOSIS — Z98.890: ICD-10-CM

## 2023-09-16 DIAGNOSIS — F41.9: Primary | ICD-10-CM

## 2023-09-16 LAB
ALP SERPL-CCNC: 98 U/L (ref 46–116)
ALT SERPL W P-5'-P-CCNC: 34 U/L (ref 10–49)
APTT PPP: 27.1 SECONDS (ref 20–32.1)
BASOPHILS # BLD AUTO: 0.1 10*3/UL (ref 0–0.1)
BASOPHILS NFR BLD AUTO: 0.7 % (ref 0–1)
BUN SERPL-MCNC: 12 MG/DL (ref 9–23)
CHLORIDE SERPL-SCNC: 107 MMOL/L (ref 98–107)
EOSINOPHIL # BLD AUTO: 0.3 10*3/UL (ref 0–0.4)
EOSINOPHIL # BLD AUTO: 2.4 % (ref 1–4)
EPI CELLS #/AREA URNS HPF: (no result) /[HPF]
ERYTHROCYTE [DISTWIDTH] IN BLOOD BY AUTOMATED COUNT: 13.6 % (ref 0–14.5)
ETHANOL SERPL-MCNC: < 3 MG/DL (ref ?–3)
HCT VFR BLD AUTO: 42 % (ref 42–52)
INR BLD: 1 (ref 2–3.5)
LIPASE SERPL-CCNC: 32 U/L (ref 12–53)
LYMPHOCYTES # BLD AUTO: 3.1 10*3/UL (ref 1.3–4.4)
LYMPHOCYTES NFR BLD AUTO: 29.1 % (ref 27–41)
MCH RBC QN AUTO: 29.8 PG (ref 27–31)
MCHC RBC AUTO-ENTMCNC: 34.5 G/DL (ref 33–37)
MCV RBC AUTO: 86.2 FL (ref 80–94)
MONOCYTES # BLD AUTO: 0.9 10*3/UL (ref 0.1–1)
MONOCYTES NFR BLD MANUAL: 8.6 % (ref 3–9)
NEUT #: 6.2 10*3/UL (ref 2.3–7.9)
NEUT %: 58.4 % (ref 47–73)
NRBC BLD QL AUTO: 0 % (ref 0–0)
PH UR STRIP: 7 [PH] (ref 4.5–8)
PLATELET # BLD AUTO: 234 10*3/UL (ref 130–400)
PMV BLD AUTO: 10.5 FL (ref 9.6–12.3)
POTASSIUM SERPL-SCNC: 4 MMOL/L (ref 3.4–5.1)
PROT SERPL-MCNC: 6.9 GM/DL (ref 6–8)
RBC # BLD AUTO: 4.87 10*6/UL (ref 4.5–5.9)
SP GR UR: 1.02 (ref 1–1.03)
UROBILINOGEN UR STRIP-MCNC: 1 E.U./DL (ref 0–1)
WBC #/AREA URNS HPF: (no result) WBC/HPF (ref 0–5)
WBC NRBC COR # BLD AUTO: 10.6 10*3/UL (ref 4.8–10.8)

## 2023-10-18 ENCOUNTER — HOSPITAL ENCOUNTER (EMERGENCY)
Dept: HOSPITAL 83 - ED | Age: 28
LOS: 1 days | Discharge: LEFT BEFORE DISCHARGE | End: 2023-10-19
Payer: COMMERCIAL

## 2023-10-18 VITALS — SYSTOLIC BLOOD PRESSURE: 140 MMHG | DIASTOLIC BLOOD PRESSURE: 99 MMHG

## 2023-10-18 VITALS — HEIGHT: 71.97 IN | BODY MASS INDEX: 22.35 KG/M2 | WEIGHT: 165 LBS

## 2023-10-18 DIAGNOSIS — F32.A: ICD-10-CM

## 2023-10-18 DIAGNOSIS — M54.12: Primary | ICD-10-CM

## 2023-10-18 DIAGNOSIS — Z98.890: ICD-10-CM

## 2023-10-18 DIAGNOSIS — F12.90: ICD-10-CM

## 2023-10-18 DIAGNOSIS — F41.9: ICD-10-CM

## 2023-10-18 DIAGNOSIS — R20.0: ICD-10-CM

## 2023-10-18 DIAGNOSIS — R53.1: ICD-10-CM

## 2023-10-18 DIAGNOSIS — R55: ICD-10-CM

## 2023-10-18 DIAGNOSIS — M25.511: ICD-10-CM

## 2023-10-18 DIAGNOSIS — Z87.891: ICD-10-CM
